# Patient Record
Sex: FEMALE | Race: WHITE | NOT HISPANIC OR LATINO | Employment: OTHER | URBAN - METROPOLITAN AREA
[De-identification: names, ages, dates, MRNs, and addresses within clinical notes are randomized per-mention and may not be internally consistent; named-entity substitution may affect disease eponyms.]

---

## 2017-01-01 ENCOUNTER — APPOINTMENT (INPATIENT)
Dept: RADIOLOGY | Facility: HOSPITAL | Age: 82
DRG: 394 | End: 2017-01-01
Payer: MEDICARE

## 2017-01-01 ENCOUNTER — APPOINTMENT (INPATIENT)
Dept: CT IMAGING | Facility: HOSPITAL | Age: 82
DRG: 394 | End: 2017-01-01
Payer: MEDICARE

## 2017-01-01 PROBLEM — G43.109 MIGRAINE WITH VISUAL AURA: Status: ACTIVE | Noted: 2017-01-01

## 2017-01-01 PROCEDURE — 74022 RADEX COMPL AQT ABD SERIES: CPT

## 2017-01-01 PROCEDURE — 70450 CT HEAD/BRAIN W/O DYE: CPT

## 2017-01-01 PROCEDURE — 74176 CT ABD & PELVIS W/O CONTRAST: CPT

## 2017-01-02 PROBLEM — K63.89 PNEUMATOSIS COLI: Status: ACTIVE | Noted: 2017-01-01

## 2018-01-12 NOTE — PROGRESS NOTES
Chief Complaint  pt is here for recheck urine      Active Problems    1  Abdominal pain (789 00) (R10 9)   2  Abnormal AST and ALT (790 4) (R74 0)   3  Acute bronchitis (466 0) (J20 9)   4  Allergic rhinitis (477 9) (J30 9)   5  Anxiety disorder (300 00) (F41 9)   6  Arthritis (716 90) (M19 90)   7  Chest discomfort (786 59) (R07 89)   8  Chronic cough (786 2) (R05)   9  Constipation (564 00) (K59 00)   10  Diarrhea (787 91) (R19 7)   11  Dyspnea on effort (786 09) (R06 09)   12  Dysuria (788 1) (R30 0)   13  Dysuria (788 1) (R30 0)   14  Hyperlipidemia (272 4) (E78 5)   15  Hypertension (401 9) (I10)   16  Lung mass (786 6) (R91 8)   17  Metastatic cancer to lung (197 0) (C78 00)   18  Need for prophylactic vaccination and inoculation against influenza (V04 81) (Z23)   19  Need for vaccination with 13-polyvalent pneumococcal conjugate vaccine (V03 82) (Z23)   20  Osteoporosis (733 00) (M81 0)   21  Overactive bladder (596 51) (N32 81)   22  Pain from bone metastases (338 3) (G89 3)   23  Pneumonia (486) (J18 9)   24  Pulmonary emphysema (492 8) (J43 9)   25  Rib pain on right side (786 50) (R07 81)   26  Rib pain on right side (786 50) (R07 81)    Current Meds   1  Align 4 MG Oral Capsule; Therapy: (Recorded:74Dre4037) to Recorded   2  AmLODIPine Besylate 2 5 MG Oral Tablet; Take 1 tablet daily; Therapy: 63NZS0023 to (Evaluate:08Jun2017)  Requested for: 19EUD8146; Last   Rx:13Jun2016 Ordered   3  Azelastine HCl - 0 1 % Nasal Solution; USE 2 SPRAYS IN EACH NOSTRIL TWICE A   DAY; Therapy: 92MSR7745 to (Last AU:95WWK5285)  Requested for: 38IPX3347 Ordered   4  Bisoprolol Fumarate 5 MG Oral Tablet; TAKE ONE-HALF (1/2) TABLET DAILY; Therapy: 03VJY2755 to (Evaluate:07Jan2017)  Requested for: 11YLV6263 Recorded   5  Breo Ellipta 100-25 MCG/INH Inhalation Aerosol Powder Breath Activated; INHALE 1   PUFFS Daily;    Therapy: 43NLC4598 to (Mala Chandler)  Requested for: 67JAS4913; Last   Rx:29Mar2016 Ordered 6  Clobetasol Propionate 0 05 % External Cream;   Therapy: 49BHP8127 to (Evaluate:55Kgs3309) Recorded   7  Fluticasone Propionate 50 MCG/ACT Nasal Suspension; USE 1 SPRAY IN EACH   NOSTRIL TWICE A DAY; Therapy: 21XDS5708 to (Khalif Lucy Alvarez)  Requested for: 15Kug4796 Ordered   8  Lisinopril 10 MG Oral Tablet; take 2 tablet daily; Therapy: 45QHW8413 to (Evaluate:17Wur2627)  Requested for: 04Luy7503; Last   Rx:25Qiz2378 Ordered   9  LORazepam 0 5 MG Oral Tablet; Take 1 tablet daily; Therapy: 08Ced8620 to (Evaluate:72Igh3377); Last Rx:58Zgp5294 Ordered   10  Nabumetone 500 MG Oral Tablet; TAKE 1 TABLET THREE TIMES A DAY; Therapy: 68Wqc4122 to (Last Rx:97Hus9735)  Requested for: 67Avm7460 Ordered   11  PreserVision AREDS 2 Oral Capsule; Therapy: (Recorded:09Sep2015) to Recorded   12  Sertraline HCl - 25 MG Oral Tablet; TAKE 1 TABLET DAILY; Therapy: 00KPH0234 to (Serenaemma Francisco)  Requested for: 10QXC9793; Last    Rx:48Uir0060 Ordered   13  Simvastatin 20 MG Oral Tablet; Take 1 tablet daily; Therapy: 20TAJ3911 to (Last Rx:94Jmb1206)  Requested for: 19Oct2015 Ordered   14  Tolterodine Tartrate ER 4 MG Oral Capsule Extended Release 24 Hour; take 1 capsule    daily; Therapy: 09VIM6227 to (Last Rx:02Feb2016)  Requested for: 03Iiy5582 Ordered   15  Vitamin D3 1000 UNIT Oral Tablet; Therapy: (Recorded:05Wni5575) to Recorded   16  ZyrTEC Allergy 10 MG Oral Capsule; Therapy: (Recorded:09Sep2015) to Recorded    Allergies    1  Oxybutynin Chloride TABS   2  Penicillins   3  Sulfa Drugs    Future Appointments    Date/Time Provider Specialty Site   09/29/2016 10:00 AM JORDY Daly   Internal Medicine [facility]     Signatures   Electronically signed by : JORDY Valenzuela ; Jul 21 2016 12:57PM EST                       (Author)

## 2018-01-15 NOTE — MISCELLANEOUS
Assessment    1  Abdominal pain (789 00) (R10 9)   2  Constipation (564 00) (K59 00)   3  Lung mass (786 6) (R91 8)   4  Hypertension (401 9) (I10)    Plan  Constipation    · Continue with our present treatment plan ; Status:Complete;   Done: 86LJT8810  05:26PM   Ordered; For:Constipation; Ordered Yayo Mayorga;    Discussion/Summary  Discussion Summary:   Patient is recovering quite well and expect no immediate complications and concerns area and she'll continue her current bowel regimen  Encouraged her to drink plenty of water  She'll start with physical therapy tomorrow  She has metastases to thoracic vertebra as well as right ilium but pain is controlled  She's currently on not any narcotic  She has appointment to see Dr Clarence Dhillon in 2 weeks  History of Present Illness  TCM Communication [facility]: The patient is being contacted for follow-up after hospitalization  She was hospitalized at [facility]  The date of admission: 05/31/2016, date of discharge: 06/03/2016  Diagnosis: Abdominal pain and contiptation  She was discharged to home  Medications were not reviewed today  She scheduled a follow up appointment  The patient is currently asymptomatic  Counseling was provided to patient's family  Communication performed and completed by Kiara Sarabia   HPI: Patient was admitted to the hospital after last visit with abdominal pain due to constipation and never to close his  The CT scan showed no further evidence of colitis  During her stay she had multiple bowel movements and abdominal pain resolved  Her lab studies showed hypokalemia which was constipated to multiple bowel movements and her potassium was replaced  There was also question of luminal gas in her bladder and was treated symptomatically with amoxicillin for UTI which she finished a few days ago and still does not report no further dysuria frequency burning  She was evaluated by GI during the hospitalization   She was sent home on MiraLAX to use when necessary if she has constipation and advised to continue with Colace every day  She's doing quite well since the discharge and reports no further abdominal pain bowels are regular 1-2 a day  She denies any back or pelvic pain were the bony lesions from lung metastases are present  She be starting physical therapy tomorrow  Review of Systems  Complete-Female:   Constitutional: feeling poorly and feeling tired, but no recent weight gain and no recent weight loss  Cardiovascular: no chest pain, no palpitations and no lower extremity edema  Respiratory: no cough and no shortness of breath during exertion  Gastrointestinal: as noted in HPI  Genitourinary: as noted in HPI  Musculoskeletal: as noted in HPI  Active Problems    1  Abdominal pain (789 00) (R10 9)   2  Abnormal AST and ALT (790 4) (R74 0)   3  Acute bronchitis (466 0) (J20 9)   4  Allergic rhinitis (477 9) (J30 9)   5  Anxiety disorder (300 00) (F41 9)   6  Arthritis (716 90) (M19 90)   7  Chest discomfort (786 59) (R07 89)   8  Chronic cough (786 2) (R05)   9  Constipation (564 00) (K59 00)   10  Diarrhea (787 91) (R19 7)   11  Dyspnea on effort (786 09) (R06 09)   12  Emphysema/COPD (492 8) (J43 9)   13  Hyperlipidemia (272 4) (E78 5)   14  Hypertension (401 9) (I10)   15  Lung mass (786 6) (R91 8)   16  Metastatic cancer to lung (197 0) (C78 00)   17  Need for prophylactic vaccination and inoculation against influenza (V04 81) (Z23)   18  Need for vaccination with 13-polyvalent pneumococcal conjugate vaccine (V03 82) (Z23)   19  Osteoporosis (733 00) (M81 0)   20  Overactive bladder (596 51) (N32 81)   21  Pain from bone metastases (338 3) (G89 3)   22  Pneumonia (486) (J18 9)   23  Rib pain on right side (786 50) (R07 81)   24  Rib pain on right side (786 50) (R07 81)    Past Medical History    1  History of Controlled diabetes mellitus (250 00) (E11 9)   2  History of CT Lung Pulmonary Nodule Solitary   3   History of acute bronchitis (V12 69) (Z87 09)   4  History of secondary malignant neoplasm of lung (V10 11) (Z85 118)   5  History of Pelvic Fracture (808 8)   6  Screening for genitourinary condition (V81 6) (Z13 89)   7  Screening for genitourinary condition (V81 6) (Z13 89)   8  Shortness of breath (786 05) (R06 02)    Surgical History    1  History of Total Abdominal Hysterectomy  Surgical History Reviewed: The surgical history was reviewed and updated today  Family History  Daughter    1  Family history of Living and Healthy    Social History    · Denied: History of Alcohol Use (History)   · Former smoker (T69 52) (S79 277)  Social History Reviewed: The social history was reviewed and updated today  Current Meds   1  Align 4 MG Oral Capsule; Therapy: (Recorded:02Mvh5424) to Recorded   2  AmLODIPine Besylate 2 5 MG Oral Tablet; Take 1 tablet daily; Therapy: 42VNO7659 to (Evaluate:05Jun2016)  Requested for: 58DEV8000; Last   Rx:11Jun2015 Ordered   3  Azelastine HCl - 0 1 % Nasal Solution; USE 2 SPRAYS IN EACH NOSTRIL TWICE A DAY; Therapy: 42QPI6818 to (Last LY:00MLH6748)  Requested for: 57OXL1418 Ordered   4  Bisoprolol Fumarate 5 MG Oral Tablet; TAKE ONE-HALF (1/2) TABLET DAILY; Therapy: 36AAY1698 to (Evaluate:07Jan2017)  Requested for: 87QRF0675 Recorded   5  Breo Ellipta 100-25 MCG/INH Inhalation Aerosol Powder Breath Activated; INHALE 1   PUFFS Daily; Therapy: 57BOH2544 to (Anabella Dense)  Requested for: 24ZNG5553; Last   Rx:29Mar2016 Ordered   6  Clobetasol Propionate 0 05 % External Cream;   Therapy: 43QUL1036 to (Evaluate:88Fes1156) Recorded   7  Fluticasone Propionate 50 MCG/ACT Nasal Suspension; USE 1 SPRAY IN EACH   NOSTRIL TWICE A DAY; Therapy: 96ZXK0343 to (Last Jesenia Husbands)  Requested for: 21Zxo2126 Ordered   8  Lisinopril 10 MG Oral Tablet; take 2 tablet daily; Therapy: 26BMR6022 to (Evaluate:37Teb3358)  Requested for: 89Rrx8682; Last   Rx:25Uwd7565 Ordered   9   LORazepam 0 5 MG Oral Tablet; Take 1 tablet daily; Therapy: 88Hrp8566 to (Evaluate:54Ocv2559); Last Rx:45Qws0219 Ordered   10  Nabumetone 500 MG Oral Tablet; TAKE 1 TABLET THREE TIMES A DAY; Therapy: 37Our4730 to (Last Rx:93Fsr0175)  Requested for: 34Xgh7212 Ordered   11  PreserVision AREDS 2 Oral Capsule; Therapy: (Recorded:07Vps3813) to Recorded   12  Sertraline HCl - 25 MG Oral Tablet; TAKE 1 TABLET DAILY; Therapy: 63LOW3122 to (Saumya Fortis)  Requested for: 15ROH4848; Last    Rx:27Wtn2587 Ordered   13  Simvastatin 20 MG Oral Tablet; Take 1 tablet daily; Therapy: 63MEH5431 to (Last Rx:22Fpv6255)  Requested for: 19Oct2015 Ordered   14  Tolterodine Tartrate ER 4 MG Oral Capsule Extended Release 24 Hour; take 1 capsule    daily; Therapy: 38KGQ9942 to (Last Rx:48Yoc8005)  Requested for: 26Cph1954 Ordered   15  Vitamin D3 1000 UNIT Oral Tablet; Therapy: (Recorded:57Nmn5619) to Recorded   16  ZyrTEC Allergy 10 MG Oral Capsule; Therapy: (Recorded:09Sep2015) to Recorded  Medication List Reviewed: The medication list was reviewed and updated today  Allergies    1  Oxybutynin Chloride TABS   2  Penicillins   3  Sulfa Drugs    Vitals  Signs [Data Includes: Current Encounter]   Recorded: 16ZGF8719 01:49PM   Heart Rate: 82  Systolic: 614  Diastolic: 68  Height: 5 ft 2 in  Weight: 124 lb 2 08 oz  BMI Calculated: 22 7  BSA Calculated: 1 56  O2 Saturation: 95    Physical Exam    Constitutional   General appearance: No acute distress, well appearing and well nourished  Back to baseline  Eyes   Conjunctiva and lids: No swelling, erythema or discharge  No pallor  Ears, Nose, Mouth, and Throat   Oropharynx: Normal with no erythema, edema, exudate or lesions  Pulmonary   Respiratory effort: No increased work of breathing or signs of respiratory distress  Auscultation of lungs: Clear to auscultation  Cardiovascular   Auscultation of heart: Normal rate and rhythm, normal S1 and S2, without murmurs  Examination of extremities for edema and/or varicosities: Normal     Abdomen   Abdomen: Abnormal   Very mild tender on deep palpation left side abdomen  Musculoskeletal   Gait and station: Normal     Psychiatric   Orientation to person, place, and time: Normal     Mood and affect: Normal          Future Appointments    Date/Time Provider Specialty Site   06/27/2016 10:20 AM JORDY Rodgers   Internal Medicine [facility]     Signatures   Electronically signed by : JORDY Blount ; Jun 9 2016  5:26PM EST                       (Author)

## 2018-01-16 NOTE — PROGRESS NOTES
Assessment    1  Chest discomfort (786 59) (R07 89)   2  Lung mass (786 6) (R91 8)   3  Hypertension (401 9) (I10)    Plan  Unlinked    · Physical Therapy Referral Other Physician Referral  Consult  Status: Hold For -  Scheduling,Retrospective Authorization  Requested for: 28XBL4371  are Referring to a non- Preferred Provider : Established Patient  Care Summary provided  : Yes    Discussion/Summary  Discussion Summary:   [place] lungs are clear to percussion and auscultation cardiac examination is benign but she still is tender over some of the right lower ribs  At this point we discussed the situation with her daughter actually gave the daughter Dunia Enciso a copy of the chest x-ray and rib films previously done  Were going to go ahead with a CAT scan of the chest with special bone windows  She's on some nabumetone 500 mg which she's only been taking once a day [place] recommend that this be taken twice a day  An additional have her supplement this with Tylenol  Will await the results of the CAT scan before making any other recommendations  Chief Complaint  Chief Complaint Free Text Note Form: 2 month follow up  History of Present Illness  HPI: Mrs Bruce Lal returns to the office accompanied by her daughter Dunia Enciso  She continues to have some pain in her back  May be remembered that she had a fall around New Year's Donna was evaluated at the hospital x-rays were taken and the pain seemed to be intermittent did not seem to be a major factor  During her previous visit the pain was again gentry and x-rays were taken of the ribs and chest and ribs were negative but the chest x-ray showed an enlarging mass previously identified and felt to be stage IIIB lung cancer  At this point Mrs Bruce Lal seems to continue to complain about the back soreness  Active Problems    1  Abdominal pain (789 00) (R10 9)   2  Abnormal AST and ALT (790 4) (R74 0)   3  Acute bronchitis (466 0) (J20 9)   4   Allergic rhinitis (477 9) (J30 9) 5  Anxiety disorder (300 00) (F41 9)   6  Arthritis (716 90) (M19 90)   7  Chest discomfort (786 59) (R07 89)   8  Chronic cough (786 2) (R05)   9  Diarrhea (787 91) (R19 7)   10  Dyspnea on effort (786 09) (R06 09)   11  Emphysema/COPD (492 8) (J43 9)   12  Hyperlipidemia (272 4) (E78 5)   13  Hypertension (401 9) (I10)   14  Lung mass (786 6) (R91 8)   15  Need for prophylactic vaccination and inoculation against influenza (V04 81) (Z23)   16  Need for vaccination with 13-polyvalent pneumococcal conjugate vaccine (V03 82) (Z23)   17  Osteoporosis (733 00) (M81 0)   18  Overactive bladder (596 51) (N32 81)   19  Pneumonia (486) (J18 9)   20  Rib pain on right side (786 50) (R07 81)   21  Rib pain on right side (786 50) (R07 81)    Past Medical History    1  History of Controlled diabetes mellitus (250 00) (E11 9)   2  History of CT Lung Pulmonary Nodule Solitary   3  History of acute bronchitis (V12 69) (Z87 09)   4  History of Pelvic Fracture (808 8)   5  Screening for genitourinary condition (V81 6) (Z13 89)   6  Shortness of breath (786 05) (R06 02)    Surgical History    1  History of Total Abdominal Hysterectomy    Family History    1  Family history of Living and Healthy    Social History    · Denied: History of Alcohol Use (History)   · Former smoker (V15 82) (W57 532)    Current Meds   1  Align 4 MG Oral Capsule; Therapy: (Recorded:08Ajx7599) to Recorded   2  AmLODIPine Besylate 2 5 MG Oral Tablet; Take 1 tablet daily; Therapy: 45WFY5112 to (Evaluate:05Jun2016)  Requested for: 50SWZ1182; Last Rx:11Jun2015   Ordered   3  Azelastine HCl - 0 1 % Nasal Solution; USE 2 SPRAYS IN EACH NOSTRIL TWICE A DAY; Therapy: 15ZUA9121 to (Last LR:06MAA7201)  Requested for: 85FBJ5006 Ordered   4  Bisoprolol Fumarate 5 MG Oral Tablet; TAKE ONE-HALF (1/2) TABLET DAILY; Therapy: 93KPV3621 to (Evaluate:07Jan2017)  Requested for: 98DZW3193 Recorded   5   Breo Ellipta 100-25 MCG/INH Inhalation Aerosol Powder Breath Activated; INHALE 1 PUFFS Daily; Therapy: 28CYO3596 to (Evaluate:60Bop4960)  Requested for: 92ZUH3313; Last Rx:08Jan2016   Ordered   6  Clobetasol Propionate 0 05 % External Cream;   Therapy: 80GNV6428 to (Evaluate:19Ejc9582) Recorded   7  Fluticasone Propionate 50 MCG/ACT Nasal Suspension; USE 1 SPRAY IN EACH NOSTRIL TWICE A   DAY; Therapy: 04TTF3936 to (Khalif Chun)  Requested for: 72Wal0065 Ordered   8  Lisinopril 10 MG Oral Tablet; take 2 tablet daily; Therapy: 01ICZ8350 to (Evaluate:43Vhl4101)  Requested for: 29Xhq7472; Last Rx:63Nec2385 Ordered   9  LORazepam 0 5 MG Oral Tablet; Take 1 tablet daily; Therapy: 22Szm6308 to (Evaluate:95Mtr3058); Last Rx:37Cxe3761 Ordered   10  Nabumetone 500 MG Oral Tablet; TAKE 1 TABLET THREE TIMES A DAY; Therapy: 58Ufj7571 to (Last Rx:70Tuk0812)  Requested for: 98Uxf5883 Ordered   11  PreserVision AREDS 2 Oral Capsule; Therapy: (Recorded:61Xsb3298) to Recorded   12  Sertraline HCl - 25 MG Oral Tablet; TAKE 1 TABLET DAILY; Therapy: 05XDP0738 to (Alba Dhillon)  Requested for: 68JGN1214; Last Rx:68Jqm5428    Ordered   13  Simvastatin 20 MG Oral Tablet; Take 1 tablet daily; Therapy: 40MQS1421 to (Last Rx:19Oct2015)  Requested for: 19Oct2015 Ordered   14  Tolterodine Tartrate ER 4 MG Oral Capsule Extended Release 24 Hour; take 1 capsule daily; Therapy: 46HKU4917 to (Last Rx:06Ucc1294)  Requested for: 96Ket6078 Ordered   15  Vitamin D3 1000 UNIT Oral Tablet; Therapy: (Recorded:21Yxy5399) to Recorded   16  ZyrTEC Allergy 10 MG Oral Capsule; Therapy: (Recorded:69Ebk5073) to Recorded    Allergies    1  Oxybutynin Chloride TABS   2  Penicillins   3  Sulfa Drugs    Vitals  Vital Signs [Data Includes: Current Encounter]    Recorded: 61WNU6329 11:02AM   Pulse Quality Regular   Systolic 425   Diastolic 70     Physical Exam    Constitutional   General appearance: Abnormal   We'll delay and frail     Pulmonary   Respiratory effort: No increased work of breathing or signs of respiratory distress  Auscultation of lungs: Clear to auscultation  Cardiovascular   Auscultation of heart: Normal rate and rhythm, normal S1 and S2, without murmurs      Psychiatric   Orientation to person, place, and time: Normal     Mood and affect: Normal          Signatures   Electronically signed by : DANNY Stephenson MD; Mar 29 2016 11:06AM EST                       (Author)